# Patient Record
Sex: MALE
[De-identification: names, ages, dates, MRNs, and addresses within clinical notes are randomized per-mention and may not be internally consistent; named-entity substitution may affect disease eponyms.]

---

## 2023-04-27 ENCOUNTER — NURSE TRIAGE (OUTPATIENT)
Dept: OTHER | Facility: CLINIC | Age: 43
End: 2023-04-27

## 2023-04-27 NOTE — TELEPHONE ENCOUNTER
[facility]    Received call from [facility] at [facility] with Red Flag Complaint. Joshua Shade MRN: [id number]    Provider: Lexus Castillo    Subjective: Caller states \"exposed to TB months ago\"     Current Symptoms: chest pain (dull all the time) sharp with coughing, cough, fatigue, breathing sounds crackly/wheezing. Coughing spells can make him lightheaded. Symptoms for about 1.5 weeks    Associated Symptoms: reduced appetite during the day, hungry at night    Pain Severity: 2-3/10 6-7/10 with coughing; sharp, dull, aching; constant, waxing and waning    Temperature: denies fever, chills at night, denies night sweats    What has been tried: ibuprofen    Recommended disposition: Go to ED/[facility] Now (Or to Office with PCP Approval)    Care advice provided, patient verbalizes understanding; denies any other questions or concerns.     Outcome: Patient going to ED      This triage is a result of a call to the [facility] 258    Reason for Disposition   Chest pain lasting longer than 5 minutes and occurred in last 3 days (72 hours) (Exception: feels exactly the same as previously diagnosed heartburn and has accompanying sour taste in mouth)    Protocols used: Chest Pain-ADULT-[place]